# Patient Record
Sex: FEMALE | Race: WHITE | NOT HISPANIC OR LATINO | Employment: OTHER | URBAN - METROPOLITAN AREA
[De-identification: names, ages, dates, MRNs, and addresses within clinical notes are randomized per-mention and may not be internally consistent; named-entity substitution may affect disease eponyms.]

---

## 2020-11-04 ENCOUNTER — OFFICE VISIT (OUTPATIENT)
Dept: PODIATRY | Facility: CLINIC | Age: 75
End: 2020-11-04
Payer: MEDICARE

## 2020-11-04 VITALS
HEIGHT: 62 IN | SYSTOLIC BLOOD PRESSURE: 178 MMHG | WEIGHT: 137 LBS | TEMPERATURE: 98 F | DIASTOLIC BLOOD PRESSURE: 72 MMHG | BODY MASS INDEX: 25.21 KG/M2 | HEART RATE: 66 BPM

## 2020-11-04 DIAGNOSIS — L60.0 INGROWN NAIL: Primary | ICD-10-CM

## 2020-11-04 DIAGNOSIS — L03.032 PARONYCHIA OF GREAT TOE, LEFT: ICD-10-CM

## 2020-11-04 PROCEDURE — 99203 PR OFFICE/OUTPT VISIT, NEW, LEVL III, 30-44 MIN: ICD-10-PCS | Mod: S$PBB,,, | Performed by: PODIATRIST

## 2020-11-04 PROCEDURE — 99999 PR PBB SHADOW E&M-NEW PATIENT-LVL III: ICD-10-PCS | Mod: PBBFAC,,, | Performed by: PODIATRIST

## 2020-11-04 PROCEDURE — 99203 OFFICE O/P NEW LOW 30 MIN: CPT | Mod: PBBFAC | Performed by: PODIATRIST

## 2020-11-04 PROCEDURE — 99999 PR PBB SHADOW E&M-NEW PATIENT-LVL III: CPT | Mod: PBBFAC,,, | Performed by: PODIATRIST

## 2020-11-04 PROCEDURE — 99203 OFFICE O/P NEW LOW 30 MIN: CPT | Mod: S$PBB,,, | Performed by: PODIATRIST

## 2020-11-04 RX ORDER — HYDROCHLOROTHIAZIDE 25 MG/1
25 TABLET ORAL DAILY
COMMUNITY

## 2020-11-04 RX ORDER — LISINOPRIL 10 MG/1
10 TABLET ORAL DAILY
COMMUNITY
End: 2023-04-04

## 2020-11-04 RX ORDER — CLOPIDOGREL BISULFATE 75 MG/1
75 TABLET ORAL DAILY
COMMUNITY

## 2020-11-04 RX ORDER — INFLUENZA A VIRUS A/MICHIGAN/45/2015 X-275 (H1N1) ANTIGEN (FORMALDEHYDE INACTIVATED), INFLUENZA A VIRUS A/SINGAPORE/INFIMH-16-0019/2016 IVR-186 (H3N2) ANTIGEN (FORMALDEHYDE INACTIVATED), INFLUENZA B VIRUS B/PHUKET/3073/2013 ANTIGEN (FORMALDEHYDE INACTIVATED), AND INFLUENZA B VIRUS B/MARYLAND/15/2016 BX-69A ANTIGEN (FORMALDEHYDE INACTIVATED) 60; 60; 60; 60 UG/.7ML; UG/.7ML; UG/.7ML; UG/.7ML
INJECTION, SUSPENSION INTRAMUSCULAR
COMMUNITY
Start: 2020-10-01 | End: 2022-06-27

## 2020-11-04 RX ORDER — AMLODIPINE BESYLATE 10 MG/1
10 TABLET ORAL DAILY
COMMUNITY

## 2020-11-07 PROBLEM — L03.032 PARONYCHIA OF GREAT TOE, LEFT: Status: ACTIVE | Noted: 2020-11-07

## 2020-11-07 PROBLEM — L60.0 INGROWN NAIL: Status: ACTIVE | Noted: 2020-11-07

## 2020-11-08 NOTE — PROGRESS NOTES
Subjective:       Patient ID: Princess Pittman is a 74 y.o. female.    Chief Complaint: Nail Problem and Ingrown Toenail    Presents for new patient evaluation she is complaining about a painful right great toe she states she traumatized the toe a it is not been right since then and she she has had continuous problems.  Patient states she just had an angioplasty right side and states that she may end up needing a bypass.  Past Medical History:   Diagnosis Date    Hypertension      Past Surgical History:   Procedure Laterality Date    ANGIOPLASTY      right leg    BYPASS OF MESENTERIC ARTERY      CATARACT EXTRACTION      CHOLECYSTECTOMY      ELBOW FUSION      HYSTERECTOMY      WRIST FUSION      right      Family History   Problem Relation Age of Onset    Heart disease Brother      Social History     Socioeconomic History    Marital status:      Spouse name: Not on file    Number of children: Not on file    Years of education: Not on file    Highest education level: Not on file   Occupational History    Not on file   Social Needs    Financial resource strain: Not on file    Food insecurity     Worry: Not on file     Inability: Not on file    Transportation needs     Medical: Not on file     Non-medical: Not on file   Tobacco Use    Smoking status: Former Smoker     Years: 8.00     Types: Cigarettes    Smokeless tobacco: Never Used   Substance and Sexual Activity    Alcohol use: Not Currently    Drug use: Never    Sexual activity: Not Currently     Partners: Male   Lifestyle    Physical activity     Days per week: Not on file     Minutes per session: Not on file    Stress: Not on file   Relationships    Social connections     Talks on phone: Not on file     Gets together: Not on file     Attends Oriental orthodox service: Not on file     Active member of club or organization: Not on file     Attends meetings of clubs or organizations: Not on file     Relationship status: Not on file   Other Topics  "Concern    Not on file   Social History Narrative    Not on file       Current Outpatient Medications   Medication Sig Dispense Refill    amLODIPine (NORVASC) 10 MG tablet Take 10 mg by mouth once daily.      clopidogreL (PLAVIX) 75 mg tablet Take 75 mg by mouth once daily.      hydroCHLOROthiazide (HYDRODIURIL) 25 MG tablet Take 25 mg by mouth once daily.      lisinopriL 10 MG tablet Take 10 mg by mouth once daily.      FLUZONE HIGHDOSE QUAD 20-21  mcg/0.7 mL Syrg PHARMACIST ADMINISTERED IMMUNIZATION ADMINISTERED AT TIME OF DISPENSING       No current facility-administered medications for this visit.      Review of patient's allergies indicates:   Allergen Reactions    Contrast media Anaphylaxis    Percocet [oxycodone-acetaminophen] Nausea And Vomiting       Review of Systems   Skin: Positive for color change.   All other systems reviewed and are negative.      Objective:      Vitals:    11/04/20 0856   BP: (!) 178/72   Pulse: 66   Temp: 97.8 °F (36.6 °C)   Weight: 62.1 kg (137 lb)   Height: 5' 1.5" (1.562 m)     Physical Exam  Vitals signs and nursing note reviewed.   Constitutional:       Appearance: Normal appearance.   Cardiovascular:      Pulses:           Dorsalis pedis pulses are 1+ on the right side and 1+ on the left side.        Posterior tibial pulses are 0 on the right side and 0 on the left side.   Pulmonary:      Effort: Pulmonary effort is normal.   Musculoskeletal: Normal range of motion.         General: Swelling, tenderness and signs of injury present.   Feet:      Right foot:      Protective Sensation: 2 sites tested. 2 sites sensed.      Skin integrity: Erythema and warmth present.      Toenail Condition: Right toenails are ingrown.      Left foot:      Protective Sensation: 2 sites tested. 2 sites sensed.   Skin:     General: Skin is warm.      Capillary Refill: Capillary refill takes more than 3 seconds.      Findings: Erythema present.   Neurological:      General: No focal " deficit present.      Mental Status: She is alert.   Psychiatric:         Mood and Affect: Mood normal.         Behavior: Behavior normal.         Thought Content: Thought content normal.         Judgment: Judgment normal.                  Assessment:       1. Ingrown nail    2. Paronychia of great toe, left        Plan:       Presents for new patient evaluation she is complaining about a painful right great toe she states she traumatized the toe a it is not been right since then and she she has had continuous problems.  Patient states she just had an angioplasty right side and states that she may end up needing a bypass.  Upon evaluation I have advised the patient typically when the toenail as been traumatized it does not grow back normal and that is what appears to have occurred to her right hallux nail especially when comparing this to the left hallux nail the nail has become very incurvated thick dystrophic and a large portion of the nail is growing into and causing infection lateral border right hallux.  I was able to aggressively trim and remove the ingrowing toenail I flushed irrigated the area very well apply bacitracin ointment and a light dressing I explained to the patient she needs to keep this trimmed out I showed her how to keep it trimmed out today advising her because of the trauma it is going to try to grow back very same way by preventative we trimming it at the 1st of every month she will likely not have any additional problems.  Patient may need a nail avulsion in the future if this continues to be an issue.  Total face-to-face time including discussion evaluation treatment discussion of treatment plan and removal of the ingrowing toenail equaled 30 min.This note was created using [employer] voice recognition software that occasionally misinterpreted phrases or words.

## 2022-06-23 ENCOUNTER — TELEPHONE (OUTPATIENT)
Dept: PODIATRY | Facility: CLINIC | Age: 77
End: 2022-06-23
Payer: MEDICARE

## 2022-06-23 NOTE — TELEPHONE ENCOUNTER
----- Message from [facility] sent at 6/23/2022 11:18 AM CDT -----  Contact: pt at  [phone]  Type: Needs Medical Advice  Who Called:  pt  Best Call Back Number: [phone]  Additional Information: pt is calling the office requesting a call back from the nurse. Please call back and advise.

## 2022-06-23 NOTE — TELEPHONE ENCOUNTER
Scheduled patient appt. Left great toenail purple and throbbing with worsening pain x 2 weeks. [facility] to [facility] in [place]. Patient informed by [facility] no fracture per xray.

## 2022-06-24 ENCOUNTER — OFFICE VISIT (OUTPATIENT)
Dept: PODIATRY | Facility: CLINIC | Age: 77
End: 2022-06-24
Payer: MEDICARE

## 2022-06-24 VITALS
HEART RATE: 66 BPM | RESPIRATION RATE: 16 BRPM | HEIGHT: 62 IN | BODY MASS INDEX: 26.68 KG/M2 | DIASTOLIC BLOOD PRESSURE: 60 MMHG | SYSTOLIC BLOOD PRESSURE: 151 MMHG | WEIGHT: 145 LBS

## 2022-06-24 DIAGNOSIS — M79.89 PAIN AND SWELLING OF TOE, LEFT: ICD-10-CM

## 2022-06-24 DIAGNOSIS — M79.675 PAIN AND SWELLING OF TOE, LEFT: ICD-10-CM

## 2022-06-24 DIAGNOSIS — S99.922S INJURY OF TOENAIL OF LEFT FOOT, SEQUELA: Primary | ICD-10-CM

## 2022-06-24 PROCEDURE — 99213 OFFICE O/P EST LOW 20 MIN: CPT | Mod: S$PBB,,, | Performed by: PODIATRIST

## 2022-06-24 PROCEDURE — 99214 OFFICE O/P EST MOD 30 MIN: CPT | Mod: PBBFAC | Performed by: PODIATRIST

## 2022-06-24 PROCEDURE — 99999 PR PBB SHADOW E&M-EST. PATIENT-LVL IV: CPT | Mod: PBBFAC,,, | Performed by: PODIATRIST

## 2022-06-24 PROCEDURE — 99999 PR PBB SHADOW E&M-EST. PATIENT-LVL IV: ICD-10-PCS | Mod: PBBFAC,,, | Performed by: PODIATRIST

## 2022-06-24 PROCEDURE — 99213 PR OFFICE/OUTPT VISIT, EST, LEVL III, 20-29 MIN: ICD-10-PCS | Mod: S$PBB,,, | Performed by: PODIATRIST

## 2022-06-24 RX ORDER — ISOSORBIDE MONONITRATE 30 MG/1
30 TABLET, EXTENDED RELEASE ORAL EVERY MORNING
COMMUNITY
Start: 2022-03-17

## 2022-06-24 RX ORDER — NITROGLYCERIN 0.3 MG/1
TABLET SUBLINGUAL
COMMUNITY
Start: 2022-01-19

## 2022-06-24 RX ORDER — OLMESARTAN MEDOXOMIL 20 MG/1
20 TABLET ORAL DAILY
COMMUNITY
Start: 2022-06-15 | End: 2023-04-04

## 2022-06-24 RX ORDER — CANDESARTAN 32 MG/1
32 TABLET ORAL DAILY
COMMUNITY
Start: 2022-06-15

## 2022-06-27 NOTE — PROGRESS NOTES
Subjective:       Patient ID: Princess Pittman is a 76 y.o. female.    Chief Complaint: Nail Problem  Patient presents with complaint of pain left great toe nail, this has been ongoing over the last few week as she has had a couple injuries to the same location of her foot.  She hit the table with this toe, dropped her phone on it twice, then bumped it with the vacuum  all within a couple days.  Relates she went to [facility] an [facility]-ray the beginning of this month July 8th/9th.  No fracture.  Nail however his becoming more painful.  Denies drainage    Past Medical History:   Diagnosis Date    Hypertension      Past Surgical History:   Procedure Laterality Date    ANGIOPLASTY      right leg    BYPASS OF MESENTERIC ARTERY      CATARACT EXTRACTION      CHOLECYSTECTOMY      ELBOW FUSION      HYSTERECTOMY      WRIST FUSION      right      Family History   Problem Relation Age of Onset    Heart disease Brother     Diabetes Mother     Heart disease Father     Cancer Father      Social History     Socioeconomic History    Marital status:    Tobacco Use    Smoking status: Former Smoker     Years: 8.00     Types: Cigarettes    Smokeless tobacco: Never Used    Tobacco comment: Quit 20 years ago   Substance and Sexual Activity    Alcohol use: Not Currently    Drug use: Never    Sexual activity: Not Currently     Partners: Male       Current Outpatient Medications   Medication Sig Dispense Refill    amLODIPine (NORVASC) 10 MG tablet Take 10 mg by mouth once daily.      candesartan (ATACAND) 32 MG tablet Take 32 mg by mouth once daily.      clopidogreL (PLAVIX) 75 mg tablet Take 75 mg by mouth once daily.      hydroCHLOROthiazide (HYDRODIURIL) 25 MG tablet Take 25 mg by mouth once daily.      isosorbide mononitrate (IMDUR) 30 MG 24 hr tablet Take 30 mg by mouth every morning.      olmesartan (BENICAR) 20 MG tablet Take 20 mg by mouth once daily.      lisinopriL 10 MG tablet Take 10 mg  "by mouth once daily.      nitroGLYCERIN (NITROSTAT) 0.3 MG SL tablet Place under the tongue.       No current facility-administered medications for this visit.     Review of patient's allergies indicates:   Allergen Reactions    Contrast media Anaphylaxis    Adhesive tape-silicones Rash    Diphenoxylate-atropine      Other reaction(s): GI intolerance  Stomach cramps    Latex      Other reaction(s): Other (see comments)  Blisters    Percocet [oxycodone-acetaminophen] Nausea And Vomiting       Review of Systems   HENT: Negative for congestion.    Respiratory: Negative for cough and shortness of breath.    Cardiovascular: Negative for leg swelling.   Musculoskeletal: Negative for gait problem.       Objective:      Vitals:    06/24/22 1311   BP: (!) 151/60   Pulse: 66   Resp: 16   Weight: 65.8 kg (145 lb)   Height: 5' 1.5" (1.562 m)     Physical Exam  Vitals and nursing note reviewed.   Constitutional:       General: She is not in acute distress.  Cardiovascular:      Pulses:           Dorsalis pedis pulses are 1+ on the right side and 2+ on the left side.        Posterior tibial pulses are 1+ on the right side and 1+ on the left side.   Pulmonary:      Effort: Pulmonary effort is normal.   Musculoskeletal:         General: Normal range of motion.   Feet:      Right foot:      Skin integrity: Dry skin present.      Left foot:      Skin integrity: Erythema (There is some erythema of the left great toe with no calor.  Nail plate is painful upon dorsal pressure, no evidence of hematoma or subungual abscess.  Nail plate is curved and dystrophic) and dry skin present. No skin breakdown or warmth.      Toenail Condition: Left toenails are abnormally thick.   Skin:     Capillary Refill: Capillary refill takes 2 to 3 seconds.   Neurological:      General: No focal deficit present.   Psychiatric:         Mood and Affect: Mood normal.         Behavior: Behavior normal.                           Assessment:       1. Injury " of toenail of left foot, sequela    2. Pain and swelling of toe, left        Plan:         Advised patient due to injuries, thickened nail, curvature of the nail plate nail is possibly slightly incurvated.  Tender nail borders debrided.  No evidence of nail or infection.  Triple antibiotic ointment, gauze and Coban applied.  Instructed patient to leave this bandage intact until the morning, if sore apply same type bandage during the day, soaking water and Epson salt in the evening and leave uncovered overnight.  Continue same treatment for 2-3 days until any residual discomfort has resolved.  Advised if beneficial especially to help with swelling the left great toe she can continue once daily warm water Epson salt soaks.  We also reviewed ice/cool therapy for 10 minutes few times daily to help with redness and inflammation on the top of the toe due to previous injuries.  Instructed patient to monitor for any changes, any increase in pain, redness, swelling or discoloration of the nail or toe.  Contact our office immediately with any concerns or if this area is not pain-free in 1 week  Explained the patient once the nail is pain-free would recommend topical treatment for damaged thick nail.  We discussed Vicks vapor rub or tea tree oil once daily.  Reduce thickness of the nail, file weekly to prevent it from becoming thick and painful.   Patient in understanding and agreement with treatment plan.  I counseled the patient on their conditions, implications and medical management.  Instructed patient to contact the office with any changes, questions, concerns, worsening of symptoms.   Total face-to-face time, exam, assessment, treatment, discussion, documentation 20 minutes, more than half this time spent on consultation and coordination of care.  Follow up as needed    This note was created using [employer] voice recognition software that occasionally misinterpreted phrases or words.

## 2023-03-16 ENCOUNTER — TELEPHONE (OUTPATIENT)
Dept: PODIATRY | Facility: CLINIC | Age: 78
End: 2023-03-16
Payer: MEDICARE

## 2023-03-16 ENCOUNTER — HOSPITAL ENCOUNTER (OUTPATIENT)
Dept: RADIOLOGY | Facility: HOSPITAL | Age: 78
Discharge: HOME | End: 2023-03-16
Attending: PODIATRIST
Payer: MEDICARE

## 2023-03-16 ENCOUNTER — OFFICE VISIT (OUTPATIENT)
Dept: PODIATRY | Facility: CLINIC | Age: 78
End: 2023-03-16
Payer: MEDICARE

## 2023-03-16 VITALS
DIASTOLIC BLOOD PRESSURE: 76 MMHG | SYSTOLIC BLOOD PRESSURE: 168 MMHG | HEIGHT: 62 IN | WEIGHT: 147 LBS | BODY MASS INDEX: 27.05 KG/M2 | HEART RATE: 58 BPM | RESPIRATION RATE: 16 BRPM

## 2023-03-16 DIAGNOSIS — G57.91 NEURITIS OF RIGHT FOOT: ICD-10-CM

## 2023-03-16 DIAGNOSIS — I73.9 PAD (PERIPHERAL ARTERY DISEASE): ICD-10-CM

## 2023-03-16 DIAGNOSIS — S99.929A INJURY OF LESSER TOE: ICD-10-CM

## 2023-03-16 DIAGNOSIS — G57.91 NEUROPATHY OF FOOT, RIGHT: ICD-10-CM

## 2023-03-16 DIAGNOSIS — S99.929A INJURY OF LESSER TOE: Primary | ICD-10-CM

## 2023-03-16 DIAGNOSIS — S99.921S INJURY OF TOENAIL OF RIGHT FOOT, SEQUELA: ICD-10-CM

## 2023-03-16 PROCEDURE — 73630 X-RAY EXAM OF FOOT: CPT | Mod: TC,RT

## 2023-03-16 PROCEDURE — 99999 PR PBB SHADOW E&M-EST. PATIENT-LVL IV: CPT | Mod: PBBFAC,,, | Performed by: PODIATRIST

## 2023-03-16 PROCEDURE — 99214 PR OFFICE/OUTPT VISIT, EST, LEVL IV, 30-39 MIN: ICD-10-PCS | Mod: S$PBB,,, | Performed by: PODIATRIST

## 2023-03-16 PROCEDURE — 99214 OFFICE O/P EST MOD 30 MIN: CPT | Mod: PBBFAC | Performed by: PODIATRIST

## 2023-03-16 PROCEDURE — 99999 PR PBB SHADOW E&M-EST. PATIENT-LVL IV: ICD-10-PCS | Mod: PBBFAC,,, | Performed by: PODIATRIST

## 2023-03-16 PROCEDURE — 99214 OFFICE O/P EST MOD 30 MIN: CPT | Mod: S$PBB,,, | Performed by: PODIATRIST

## 2023-03-16 PROCEDURE — 73630 XR FOOT COMPLETE 3 VIEW RIGHT: ICD-10-PCS | Mod: 26,RT,, | Performed by: RADIOLOGY

## 2023-03-16 PROCEDURE — 73630 X-RAY EXAM OF FOOT: CPT | Mod: 26,RT,, | Performed by: RADIOLOGY

## 2023-03-16 RX ORDER — TIZANIDINE 2 MG/1
2 TABLET ORAL EVERY 8 HOURS
COMMUNITY
Start: 2022-10-18 | End: 2023-04-04

## 2023-03-16 NOTE — TELEPHONE ENCOUNTER
Patient requesting appointment. Patient injured her toe a couple of weeks ago and thinks it is broken. Patient went to [facility] but they didn't xray. Patient is more concerned about the toenail to the injured toe is black and weeping. Scheduled patient same day appointment.

## 2023-03-18 NOTE — PROGRESS NOTES
Subjective:       Patient ID: Princess Pittman is a 77 y.o. female.    Chief Complaint: Toe Pain, Toe Injury, and Nail Problem  Patient presents with complaint of pain continued pain in swelling 4th digit right foot which she injured 2 weeks ago on the coffee table.  She is been soaking warm water and applying antibiotic ointment to the damaged nail.  Relates it is not improving, pain is getting worse, however she is on the treadmill daily.  She had a bypass right lower leg couple years ago by Dr. Huertas.  She is had problems with nerve pain/neuropathy since that surgery.  She finds walking daily on the treadmill significantly helps with her pain and needs to do it every day. Pain 4th toe 9/10      Past Medical History:   Diagnosis Date    Hypertension     Neuropathy     PAD (peripheral artery disease)      Past Surgical History:   Procedure Laterality Date    ANGIOPLASTY      right leg    BYPASS OF MESENTERIC ARTERY      CATARACT EXTRACTION      CHOLECYSTECTOMY      ELBOW FUSION      HYSTERECTOMY      WRIST FUSION      right      Family History   Problem Relation Age of Onset    Heart disease Brother     Diabetes Mother     Heart disease Father     Cancer Father      Social History     Socioeconomic History    Marital status:    Tobacco Use    Smoking status: Former     Years: 8.00     Types: Cigarettes    Smokeless tobacco: Never    Tobacco comments:     Quit 20 years ago   Substance and Sexual Activity    Alcohol use: Not Currently    Drug use: Never    Sexual activity: Not Currently     Partners: Male       Current Outpatient Medications   Medication Sig Dispense Refill    amLODIPine (NORVASC) 10 MG tablet Take 10 mg by mouth once daily.      candesartan (ATACAND) 32 MG tablet Take 32 mg by mouth once daily.      clopidogreL (PLAVIX) 75 mg tablet Take 75 mg by mouth once daily.      hydroCHLOROthiazide (HYDRODIURIL) 25 MG tablet Take 25 mg by mouth once daily.      isosorbide mononitrate (IMDUR) 30 MG 24 hr  "tablet Take 30 mg by mouth every morning.      olmesartan (BENICAR) 20 MG tablet Take 20 mg by mouth once daily.      tiZANidine (ZANAFLEX) 2 MG tablet Take 2 mg by mouth every 8 (eight) hours.      lisinopriL 10 MG tablet Take 10 mg by mouth once daily.      nitroGLYCERIN (NITROSTAT) 0.3 MG SL tablet Place under the tongue.       No current facility-administered medications for this visit.     Review of patient's allergies indicates:   Allergen Reactions    Contrast media Anaphylaxis    Adhesive tape-silicones Rash    Diphenoxylate-atropine      Other reaction(s): GI intolerance  Stomach cramps    Latex      Other reaction(s): Other (see comments)  Blisters    Percocet [oxycodone-acetaminophen] Nausea And Vomiting       Review of Systems   HENT:  Negative for congestion.    Respiratory:  Negative for cough.    Cardiovascular:  Negative for leg swelling.     Objective:      Vitals:    03/16/23 1057   BP: (!) 168/76   Pulse: (!) 58   Resp: 16   Weight: 66.7 kg (147 lb)   Height: 5' 1.5" (1.562 m)     Physical Exam  Vitals and nursing note reviewed.   Constitutional:       General: She is not in acute distress.     Appearance: Normal appearance.   Cardiovascular:      Pulses:           Dorsalis pedis pulses are 1+ on the right side and 1+ on the left side.        Posterior tibial pulses are 1+ on the right side and 1+ on the left side.   Pulmonary:      Effort: Pulmonary effort is normal.   Musculoskeletal:         General: Swelling and tenderness present.      Comments: Pain, edema injury 4th digit right foot   Feet:      Right foot:      Skin integrity: Erythema (Dried blood, injury 4th digit right) present.   Skin:     Capillary Refill: Capillary refill takes 2 to 3 seconds.   Neurological:      General: No focal deficit present.      Mental Status: She is alert.   Psychiatric:         Mood and Affect: Mood normal.         Behavior: Behavior normal.         Thought Content: Thought content normal.         Judgment: " Judgment normal.                       EXAMINATION:  XR FOOT COMPLETE 3 VIEW RIGHT     CLINICAL HISTORY:  . Unspecified injury of unspecified foot, initial encounter     TECHNIQUE:  AP, lateral, and oblique views of the right foot were performed.     COMPARISON:  None     FINDINGS:  There is mild soft tissue swelling of the distal 4th toe.  No linear lucency to suggest underlying fracture.     Joint spaces are preserved.  Normal tarsometatarsal alignment.     Impression: Soft tissue swelling of the 4th toe without plain film evidence to suggest underlying fracture.     Electronically signed by: Evaristo Mathur  Date:                                            03/16/2023    Assessment:       1. Injury of lesser toe - Right Foot    2. Injury of toenail of right foot, sequela    3. Neuritis of right foot    4. PAD (peripheral artery disease)    5. Neuropathy of foot, right        Plan:         X-RAY COMPLETE RIGHT FOOT      Reviewed x-rays with patient, advised no clear evidence of acute fracture, however she can have a stress fracture type injury which is not visible until it starts healing, this can take 2-6 weeks following injury  Explained however she has developed a lot of swelling of both the toe in the front of the foot compressing on nerves causing an acute neuritis/inflammation of the nerves.  Explained this is different from her usual neuropathy pain but will increase the symptoms  Instructed patient to use cool, recommend cool tap water rather than ice therapy due to circulation issues, discontinue warm water soaks or heat  We discussed frequency this should be performed as long as well tolerated to help with swelling inflammation pain of the right foot  Discussed topical treatments including over-the-counter Voltaren gel as directed as long as beneficial  Discontinue antibiotic ointment to the toenail and utilize iodine/Betadine a few times daily to keep this area dry.  Advised nail will heal much better  once swelling has improved in the toe  Instructed patient on use of Tylenol for about 3 days, she understands with Plavix therapy she can not take NSAIDs.  Tylenol is safe with Plavix would not recommend any more than 3 day regimen.  Patient understands use of the treadmill does not help with her current, acute pain, swelling and inflammation right foot, most likely has made it worse  Highly encouraged patient to discontinue treadmill for just a few days, preferably 3 to start to get some of the swelling and inflammation resolved  And we reviewed appropriate shoes to accommodate for swelling yet protect and support right foot  Reviewed neuropathic pain in other conservative treatments which can be effective  Patient was in understanding and agreement with treatment plan.  I counseled the patient on their conditions, implications and medical management.  Instructed patient to contact the office with any changes, questions, concerns, worsening of symptoms.   Total face to face time 30 minutes, exam, assessment, treatment, discussion, additional time for review of chart prior to and following appointment and visit documentation, consultation and coordination of care.    Follow up if not pain-free in 2-3 weeks, recommend follow-up x-ray    This note was created using [employer] voice recognition software that occasionally misinterpreted phrases or words.

## 2023-04-03 ENCOUNTER — TELEPHONE (OUTPATIENT)
Dept: PODIATRY | Facility: CLINIC | Age: 78
End: 2023-04-03
Payer: MEDICARE

## 2023-04-03 NOTE — TELEPHONE ENCOUNTER
Patient wants to get the shot that was offered at previous visit. The toe is painful and swollen. Do you want patient to come in for injection?

## 2023-04-04 ENCOUNTER — OFFICE VISIT (OUTPATIENT)
Dept: PODIATRY | Facility: CLINIC | Age: 78
End: 2023-04-04
Payer: MEDICARE

## 2023-04-04 VITALS
HEIGHT: 62 IN | SYSTOLIC BLOOD PRESSURE: 172 MMHG | RESPIRATION RATE: 18 BRPM | HEART RATE: 60 BPM | DIASTOLIC BLOOD PRESSURE: 71 MMHG | WEIGHT: 147 LBS | BODY MASS INDEX: 27.05 KG/M2

## 2023-04-04 DIAGNOSIS — I73.9 PAD (PERIPHERAL ARTERY DISEASE): ICD-10-CM

## 2023-04-04 DIAGNOSIS — S99.921S INJURY OF TOENAIL OF RIGHT FOOT, SEQUELA: ICD-10-CM

## 2023-04-04 DIAGNOSIS — G57.91 NEURITIS OF RIGHT FOOT: ICD-10-CM

## 2023-04-04 DIAGNOSIS — S99.929A INJURY OF LESSER TOE: Primary | ICD-10-CM

## 2023-04-04 PROCEDURE — 99999 PR PBB SHADOW E&M-EST. PATIENT-LVL IV: CPT | Mod: PBBFAC,,, | Performed by: PODIATRIST

## 2023-04-04 PROCEDURE — 99999 PR PBB SHADOW E&M-EST. PATIENT-LVL IV: ICD-10-PCS | Mod: PBBFAC,,, | Performed by: PODIATRIST

## 2023-04-04 PROCEDURE — 99214 PR OFFICE/OUTPT VISIT, EST, LEVL IV, 30-39 MIN: ICD-10-PCS | Mod: S$PBB,,, | Performed by: PODIATRIST

## 2023-04-04 PROCEDURE — 99214 OFFICE O/P EST MOD 30 MIN: CPT | Mod: S$PBB,,, | Performed by: PODIATRIST

## 2023-04-04 PROCEDURE — 96372 THER/PROPH/DIAG INJ SC/IM: CPT | Mod: PBBFAC

## 2023-04-04 PROCEDURE — 99214 OFFICE O/P EST MOD 30 MIN: CPT | Mod: PBBFAC,25 | Performed by: PODIATRIST

## 2023-04-04 RX ORDER — BETAMETHASONE SODIUM PHOSPHATE AND BETAMETHASONE ACETATE 3; 3 MG/ML; MG/ML
18 INJECTION, SUSPENSION INTRA-ARTICULAR; INTRALESIONAL; INTRAMUSCULAR; SOFT TISSUE
Status: COMPLETED | OUTPATIENT
Start: 2023-04-04 | End: 2023-04-04

## 2023-04-04 RX ORDER — IBUPROFEN 200 MG
200 TABLET ORAL EVERY 6 HOURS PRN
COMMUNITY

## 2023-04-04 RX ORDER — METFORMIN HYDROCHLORIDE 500 MG/1
500 TABLET ORAL 2 TIMES DAILY WITH MEALS
COMMUNITY

## 2023-04-04 RX ORDER — ACETAMINOPHEN 325 MG/1
325 TABLET ORAL EVERY 6 HOURS PRN
COMMUNITY

## 2023-04-04 RX ADMIN — BETAMETHASONE ACETATE AND BETAMETHASONE SODIUM PHOSPHATE 18 MG: 3; 3 INJECTION, SUSPENSION INTRA-ARTICULAR; INTRALESIONAL; INTRAMUSCULAR; SOFT TISSUE at 11:04

## 2023-04-07 NOTE — PROGRESS NOTES
Subjective:       Patient ID: Princess Pittman is a 77 y.o. female.    Chief Complaint: Foot Pain, Follow-up, and Foot Injury  Patient presents for follow-up injury 4th digit right foot, increased pain of the right foot, x-rays were negative in her last visit but patient states she has had some relief of the toe but not of the foot.  Relates feet have been more swollen, more pink, history of PVD.  Few years ago had 2 stents in the left leg followed by a bypass on the right side, she has been told she is not a candidate for any further vascular surgery.  She is had problems with neuropathy of the right foot and lower legs since the bypass and typically walks on the treadmill daily which helps her nerve pain significantly.  She is not been able to do this since our last visit. Pain level right foot 5/10       Past Medical History:   Diagnosis Date    Hypertension     Neuropathy     PAD (peripheral artery disease)      Past Surgical History:   Procedure Laterality Date    ANGIOPLASTY      right leg    BYPASS OF MESENTERIC ARTERY      CATARACT EXTRACTION      CHOLECYSTECTOMY      ELBOW FUSION      HYSTERECTOMY      WRIST FUSION      right      Family History   Problem Relation Age of Onset    Heart disease Brother     Diabetes Mother     Heart disease Father     Cancer Father      Social History     Socioeconomic History    Marital status:    Tobacco Use    Smoking status: Former     Years: 8.00     Types: Cigarettes    Smokeless tobacco: Never    Tobacco comments:     Quit 20 years ago   Substance and Sexual Activity    Alcohol use: Not Currently    Drug use: Never    Sexual activity: Not Currently     Partners: Male       Current Outpatient Medications   Medication Sig Dispense Refill    acetaminophen (TYLENOL) 325 MG tablet Take 325 mg by mouth every 6 (six) hours as needed for Pain.      amLODIPine (NORVASC) 10 MG tablet Take 10 mg by mouth once daily.      candesartan (ATACAND) 32 MG tablet Take 32 mg by mouth once  "daily.      clopidogreL (PLAVIX) 75 mg tablet Take 75 mg by mouth once daily.      hydroCHLOROthiazide (HYDRODIURIL) 25 MG tablet Take 25 mg by mouth once daily.      ibuprofen (ADVIL,MOTRIN) 200 MG tablet Take 200 mg by mouth every 6 (six) hours as needed for Pain.      isosorbide mononitrate (IMDUR) 30 MG 24 hr tablet Take 30 mg by mouth every morning.      metFORMIN (GLUCOPHAGE) 500 MG tablet Take 500 mg by mouth 2 (two) times daily with meals.      nitroGLYCERIN (NITROSTAT) 0.3 MG SL tablet Place under the tongue.       No current facility-administered medications for this visit.     Review of patient's allergies indicates:   Allergen Reactions    Contrast media Anaphylaxis    Adhesive tape-silicones Rash    Diphenoxylate-atropine      Other reaction(s): GI intolerance  Stomach cramps    Latex      Other reaction(s): Other (see comments)  Blisters    Percocet [oxycodone-acetaminophen] Nausea And Vomiting       Review of Systems   HENT:  Negative for congestion.    Respiratory:  Negative for cough.      Objective:      Vitals:    04/04/23 1108   BP: (!) 172/71   Pulse: 60   Resp: 18   Weight: 66.7 kg (147 lb)   Height: 5' 1.5" (1.562 m)     Physical Exam  Vitals and nursing note reviewed.   Constitutional:       General: She is not in acute distress.     Appearance: Normal appearance.   Cardiovascular:      Pulses:           Dorsalis pedis pulses are 1+ on the right side and 1+ on the left side.        Posterior tibial pulses are 0 on the right side and 0 on the left side.   Pulmonary:      Effort: Pulmonary effort is normal.   Musculoskeletal:         General: Swelling and tenderness present.      Comments: Pain, edema injury 4th digit right foot   Feet:      Right foot:      Skin integrity: Erythema (Dried blood, injury 4th digit right) present.      Left foot:      Skin integrity: Erythema present.   Skin:     Capillary Refill: Capillary refill takes 2 to 3 seconds.      Findings: Erythema present.      " Comments: Erythema forefoot, PVD   Neurological:      General: No focal deficit present.      Mental Status: She is alert.      Comments: Traumatic neuritis, very painful right foot   Psychiatric:         Mood and Affect: Mood normal.         Behavior: Behavior normal.         Thought Content: Thought content normal.         Judgment: Judgment normal.                       EXAMINATION:  XR FOOT COMPLETE 3 VIEW RIGHT  CLINICAL HISTORY:  Unspecified injury of unspecified foot, initial encounter   FINDINGS:  There is mild soft tissue swelling of the distal 4th toe.  No linear lucency to suggest underlying fracture.     Joint spaces are preserved.  Normal tarsometatarsal alignment.     Impression: Soft tissue swelling of the 4th toe without plain film evidence to suggest underlying fracture.     Electronically signed by: Evaristo Mathur  Date:                                            03/16/2023                  Assessment:       1. Injury of lesser toe - Right Foot    2. Neuritis of right foot    3. PAD (peripheral artery disease)    4. Injury of toenail of right foot, sequela        Plan:         18 MG BETAMETHASONE IM RIGHT HIP      Had a lengthy discussion with patient regarding potential complications due to PVD and the extent to which her circulation has been affected by the recent trauma.  Advised patient swelling of the feet further compresses arteries making it slightly more difficult for circulation  We discussed neuritis/nerve pain due to inflammation of the right foot and how a previous history of neuropathy puts her at risk for escalation in nerve symptoms with an injury.  Reviewed the difference between neuritis/inflammation of the nerves due to injury and swelling versus neuropathy.  We discussed IM cortisone injection, effectiveness in decreasing inflammation, potential side effects and length of time injection may be beneficial.  Patient understands she is not been able to perhaps properly treat the  inflammation, can not take NSAIDs due to anticoagulant therapy  We discussed signs and symptoms to monitor for regarding shutdown of circulation to the toes.  Monitor for any discoloration, any sores that are not improving in a few days.  Advised patient injury of the 4th digit nail right is stable and remains dry which is a good sign, can take a while for this to heal due to her circulation  Instructed patient do not use the treadmill until symptoms have improved   Reviewed appropriate wide light shoes to accommodate  Reviewed neuropathic pain   Check feet daily and contact the office with any area of concern which has not improved in a few days  Patient was in understanding and agreement with treatment plan.  I counseled the patient on their conditions, implications and medical management.  Instructed patient to contact the office with any changes, questions, concerns, worsening of symptoms.   Total face to face time 30 minutes, exam, assessment, treatment, discussion, additional time for review of chart prior to and following appointment and visit documentation, consultation and coordination of care.    Follow up if not pain-free in 2-3 weeks    This note was created using [employer] voice recognition software that occasionally misinterpreted phrases or words.

## 2024-05-17 ENCOUNTER — OFFICE VISIT (OUTPATIENT)
Dept: PODIATRY | Facility: CLINIC | Age: 79
End: 2024-05-17
Payer: MEDICARE

## 2024-05-17 VITALS
DIASTOLIC BLOOD PRESSURE: 57 MMHG | SYSTOLIC BLOOD PRESSURE: 142 MMHG | BODY MASS INDEX: 25.55 KG/M2 | WEIGHT: 138.88 LBS | HEIGHT: 62 IN | HEART RATE: 63 BPM

## 2024-05-17 DIAGNOSIS — B35.1 ONYCHOMYCOSIS OF TOENAIL: ICD-10-CM

## 2024-05-17 DIAGNOSIS — L60.8 ACQUIRED DYSMORPHIC TOENAIL: ICD-10-CM

## 2024-05-17 DIAGNOSIS — B35.3 TINEA PEDIS OF BOTH FEET: Primary | ICD-10-CM

## 2024-05-17 DIAGNOSIS — I73.9 PAD (PERIPHERAL ARTERY DISEASE): ICD-10-CM

## 2024-05-17 PROCEDURE — 99213 OFFICE O/P EST LOW 20 MIN: CPT | Mod: S$PBB,,, | Performed by: PODIATRIST

## 2024-05-17 PROCEDURE — 99999 PR PBB SHADOW E&M-EST. PATIENT-LVL III: CPT | Mod: PBBFAC,,, | Performed by: PODIATRIST

## 2024-05-17 PROCEDURE — 99213 OFFICE O/P EST LOW 20 MIN: CPT | Mod: PBBFAC | Performed by: PODIATRIST

## 2024-05-17 RX ORDER — PRAMIPEXOLE DIHYDROCHLORIDE 0.25 MG/1
0.25 TABLET ORAL
COMMUNITY
Start: 2024-03-18 | End: 2024-06-16

## 2024-05-17 RX ORDER — CARVEDILOL 25 MG/1
TABLET ORAL
COMMUNITY

## 2024-05-17 RX ORDER — ATORVASTATIN CALCIUM 40 MG/1
40 TABLET, FILM COATED ORAL
COMMUNITY

## 2024-05-17 RX ORDER — CLOTRIMAZOLE AND BETAMETHASONE DIPROPIONATE 10; .64 MG/G; MG/G
CREAM TOPICAL 2 TIMES DAILY
Qty: 45 G | Refills: 1 | Status: SHIPPED | OUTPATIENT
Start: 2024-05-17

## 2024-05-17 RX ORDER — VALSARTAN 40 MG/1
40 TABLET ORAL
COMMUNITY

## 2024-05-19 NOTE — PROGRESS NOTES
Subjective:       Patient ID: Princess Pittman is a 78 y.o. female.    Chief Complaint: Nail Problem and Skin Problem  Patient presents  with concern change in colors nails, both feet and 1 is black on the left foot.  Relates problems with chronic dry skin.  Has had swelling on and off in her feet.  Does have a history of PVD, had 2 stents placed in the left leg a few years ago and a bypass on the right.  Relates toes always look pink, reddish but she has not had any sores, no infections,  no pain to speak of but does have neuropathy       Past Medical History:   Diagnosis Date    Hypertension     Neuropathy     PAD (peripheral artery disease)      Past Surgical History:   Procedure Laterality Date    ANGIOPLASTY      right leg    BYPASS OF MESENTERIC ARTERY      CATARACT EXTRACTION      CHOLECYSTECTOMY      ELBOW FUSION      HYSTERECTOMY      WRIST FUSION      right      Family History   Problem Relation Name Age of Onset    Heart disease Brother      Diabetes Mother      Heart disease Father      Cancer Father       Social History     Socioeconomic History    Marital status:    Tobacco Use    Smoking status: Former     Types: Cigarettes    Smokeless tobacco: Never    Tobacco comments:     Quit 20 years ago   Substance and Sexual Activity    Alcohol use: Not Currently    Drug use: Never    Sexual activity: Not Currently     Partners: Male       Current Outpatient Medications   Medication Sig Dispense Refill    amLODIPine (NORVASC) 10 MG tablet Take 10 mg by mouth once daily.      atorvastatin (LIPITOR) 40 MG tablet Take 40 mg by mouth.      carvediloL (COREG) 25 MG tablet SMARTSI Tablet(s) By Mouth Morning-Night      clopidogreL (PLAVIX) 75 mg tablet Take 75 mg by mouth once daily.      hydroCHLOROthiazide (HYDRODIURIL) 25 MG tablet Take 25 mg by mouth once daily.      isosorbide mononitrate (IMDUR) 30 MG 24 hr tablet Take 30 mg by mouth every morning.      metFORMIN (GLUCOPHAGE) 500 MG tablet Take 500 mg by  "mouth 2 (two) times daily with meals.      nitroGLYCERIN (NITROSTAT) 0.3 MG SL tablet Place under the tongue.      pramipexole (MIRAPEX) 0.25 MG tablet 0.25 mg.      valsartan (DIOVAN) 40 MG tablet Take 40 mg by mouth.      clotrimazole-betamethasone 1-0.05% (LOTRISONE) cream Apply topically 2 (two) times daily. Apply as directed to feet 45 g 1     No current facility-administered medications for this visit.     Review of patient's allergies indicates:   Allergen Reactions    Contrast media Anaphylaxis    Adhesive tape-silicones Rash    Diphenoxylate-atropine      Other reaction(s): GI intolerance  Stomach cramps    Latex      Other reaction(s): Other (see comments)  Blisters    Percocet [oxycodone-acetaminophen] Nausea And Vomiting       Review of Systems   Cardiovascular:  Positive for leg swelling.       Objective:      Vitals:    05/17/24 1325   BP: (!) 142/57   Pulse: 63   Weight: 63 kg (138 lb 14.4 oz)   Height: 5' 1.5" (1.562 m)     Physical Exam  Vitals and nursing note reviewed.   Constitutional:       Appearance: Normal appearance.   Cardiovascular:      Pulses:           Dorsalis pedis pulses are 1+ on the right side and 1+ on the left side.        Posterior tibial pulses are 0 on the right side and 0 on the left side.   Pulmonary:      Effort: Pulmonary effort is normal.   Feet:      Right foot:      Skin integrity: Dry skin (dry tinea plantar feet) present. No skin breakdown.      Toenail Condition: Right toenails are abnormally thick. Fungal disease present.     Left foot:      Skin integrity: Dry skin present. No skin breakdown.      Toenail Condition: Left toenails are abnormally thick. Fungal disease present.  Skin:     Capillary Refill: Capillary refill takes 2 to 3 seconds.      Findings: Erythema present.      Comments: Poor color digits, PVD   Neurological:      General: No focal deficit present.      Mental Status: She is alert.   Psychiatric:         Behavior: Behavior normal. "             Assessment:       1. Tinea pedis of both feet    2. PAD (peripheral artery disease)    3. Acquired dysmorphic toenail    4. Onychomycosis of toenail          Plan:         CLOTRIMAZOLE/BETAMETHASONE 1-0.05% CR APPLY BID TO FEET      Advised patient dry skin on the bottom of her feet  has a fungal involvement, athlete's foot  Patient understands she has fungus in the toenails as well and explained patient it is best to treat skin and nails together for best results  Reviewed care and maintenance of skin and how important it is to not only treat dry skin but maintained it to prevent complications, any skin cracks or bleeding or infection especially with her history of poor circulation  Prescribed clotrimazole/betamethasone, apply twice daily x2 weeks,  can apply over the nails, make sure it is thoroughly absorbed into the skin and dry before applying socks and shoes  Explained to patient she can then use prescription medication as needed to spot treat any areas  We reviewed topical medications to prevent recurrence and hydration of skin  Discussed treatments for fungal nails, after 2 weeks of treating the skin start applying Vicks vapor rub 1-2 times daily to the nails  Reduce thickness of the nails today and showed patient how to do this, it should be done every week to prevent pain, injury, discoloration until allow medication to penetrate  more effectively  Reviewed conservative treatments for neuropathic symptoms and reviewed medications with patient as she is not currently taking anything for neuropathy  Patient was in understanding and agreement with treatment plan.  I counseled the patient on their conditions, implications and medical management.  Instructed patient to contact the office with any changes, questions, concerns, worsening of symptoms.   Total face to face time 20 minutes, exam, assessment, treatment, discussion, additional time for review of chart prior to and following appointment and  visit documentation, consultation and coordination of care.    Follow up as needed      This note was created using [email or web address] voice recognition software that occasionally misinterpreted phrases or words.